# Patient Record
Sex: FEMALE | Race: WHITE | NOT HISPANIC OR LATINO
[De-identification: names, ages, dates, MRNs, and addresses within clinical notes are randomized per-mention and may not be internally consistent; named-entity substitution may affect disease eponyms.]

---

## 2020-09-09 ENCOUNTER — TRANSCRIPTION ENCOUNTER (OUTPATIENT)
Age: 55
End: 2020-09-09

## 2021-01-12 ENCOUNTER — TRANSCRIPTION ENCOUNTER (OUTPATIENT)
Age: 56
End: 2021-01-12

## 2021-03-30 ENCOUNTER — TRANSCRIPTION ENCOUNTER (OUTPATIENT)
Age: 56
End: 2021-03-30

## 2021-10-26 ENCOUNTER — TRANSCRIPTION ENCOUNTER (OUTPATIENT)
Age: 56
End: 2021-10-26

## 2021-12-15 ENCOUNTER — TRANSCRIPTION ENCOUNTER (OUTPATIENT)
Age: 56
End: 2021-12-15

## 2022-06-04 PROBLEM — Z00.00 ENCOUNTER FOR PREVENTIVE HEALTH EXAMINATION: Status: ACTIVE | Noted: 2022-06-04

## 2022-06-20 ENCOUNTER — APPOINTMENT (OUTPATIENT)
Dept: ORTHOPEDIC SURGERY | Facility: CLINIC | Age: 57
End: 2022-06-20
Payer: COMMERCIAL

## 2022-06-20 VITALS — WEIGHT: 175 LBS | BODY MASS INDEX: 25.92 KG/M2 | HEIGHT: 69 IN

## 2022-06-20 DIAGNOSIS — M19.90 UNSPECIFIED OSTEOARTHRITIS, UNSPECIFIED SITE: ICD-10-CM

## 2022-06-20 DIAGNOSIS — Z78.9 OTHER SPECIFIED HEALTH STATUS: ICD-10-CM

## 2022-06-20 DIAGNOSIS — Z83.3 FAMILY HISTORY OF DIABETES MELLITUS: ICD-10-CM

## 2022-06-20 DIAGNOSIS — Z82.49 FAMILY HISTORY OF ISCHEMIC HEART DISEASE AND OTHER DISEASES OF THE CIRCULATORY SYSTEM: ICD-10-CM

## 2022-06-20 PROCEDURE — 99204 OFFICE O/P NEW MOD 45 MIN: CPT

## 2022-06-20 PROCEDURE — 73502 X-RAY EXAM HIP UNI 2-3 VIEWS: CPT | Mod: RT

## 2022-06-20 PROCEDURE — 73564 X-RAY EXAM KNEE 4 OR MORE: CPT | Mod: RT

## 2022-06-20 PROCEDURE — 99203 OFFICE O/P NEW LOW 30 MIN: CPT

## 2022-06-20 NOTE — HISTORY OF PRESENT ILLNESS
[de-identified] : The patient is a 57 year old R hand dominant female who presents today complaining of R hip and knee pain.  Persistent right hip pain, throbbing, worse with driving and physical activity. Denies paresthesias. Very little relief from previous cortisone injections. Pain is anterior and radiates down into her thigh. Admits to buttock pain. \par Walking in woods in January when she stepped awkwardly and felt immediate pain to the inside of her right knee. Pain has been intermittent, comes and goes but has been worse lately, worse navigating stairs. Admits to swelling at time of injury.  \par Date of Injury/Onset: 05/2019 (hip), 01/2022 (knee)\par Pain:    At Rest: Hip 4/10, Knee 2/10 \par With Activity: Hip 7/10, Knee 4/10\par Mechanism of injury: Overuse, running (hip); slipped and caught self while walking in the woods (knee) \par This is not a Work Related Injury being treated under Worker's Compensation.\par This is not an athletic injury occurring associated with an interscholastic or organized sports team.\par Quality of symptoms: focal ache, radiates to mid thigh (Hip); aching, sharp w/stairs (knee)\par Improves with: PT helped significantly for hip, No tx on knee\par Worse with: Driving (hip/knee), walking on unstable surfaces and stairs (knee)\par Prior treatment: PT, injections (hip), \par Prior Imaging: MRI (08/2019) \par Out of work/sport: No, since N/A\par School/Sport/Position/Occupation:  \par Additional Information: None\par

## 2022-06-20 NOTE — DISCUSSION/SUMMARY
[de-identified] : The patient has tried  physical therapy, anti-inflammatories, rest, RICE, all with no relief. Let this note serve as a letter of medical necessity for MRI. They will have a right knee MRI to evaluate for MMT. They will follow up with me after test. She will have a right hip hip MRI to evaluate impingement. \par Follow up after tests.\par \par "Written by Paul Ramsey, acting as Scribe for Swapnil Simmons M.D" \par \par Home Exercise \par \par  The patient is instructed on a home exercise program. \par  \par RICE \par I explained to the patient that rest, ice, compression, and elevation would benefit them.  They may return to activity after follow-up or when they no longer have any pain. \par  \par Pain Guide Activities \par The patient was advised to let pain guide the gradual advancement of activities. \par  \par Activity Modification \par The patient was advised to modify their activities. \par  \par Dx / Natural History \par The patient was advised of the diagnosis.  The natural history of the pathology was explained in full to the patient in layman's terms.  Several different treatment options were discussed and explained in full to the patient including the risks and benefits of both surgical and non-surgical treatments.  All questions and concerns were answered.\par

## 2022-06-20 NOTE — PHYSICAL EXAM
[5___] : hamstring 5[unfilled]/5 [Positive] : positive Bharat [Right] : right hip with pelvis [All Views] : anteroposterior, lateral [There are no fractures, subluxations or dislocations. No significant abnormalities are seen] : There are no fractures, subluxations or dislocations. No significant abnormalities are seen [FreeTextEntry3] : \par pain with flexion, anterolateral pain with flex abduction, no pain with log roll, +fabers [] : non-antalgic [FreeTextEntry9] : 0-130

## 2022-06-27 ENCOUNTER — RESULT REVIEW (OUTPATIENT)
Age: 57
End: 2022-06-27

## 2022-09-19 ENCOUNTER — APPOINTMENT (OUTPATIENT)
Dept: ORTHOPEDIC SURGERY | Facility: CLINIC | Age: 57
End: 2022-09-19
Payer: COMMERCIAL

## 2022-09-19 VITALS — WEIGHT: 175 LBS | HEIGHT: 69 IN | BODY MASS INDEX: 25.92 KG/M2

## 2022-09-19 DIAGNOSIS — M79.18 MYALGIA, OTHER SITE: ICD-10-CM

## 2022-09-19 PROCEDURE — 99213 OFFICE O/P EST LOW 20 MIN: CPT

## 2022-09-19 PROCEDURE — 99214 OFFICE O/P EST MOD 30 MIN: CPT

## 2022-09-19 NOTE — DISCUSSION/SUMMARY
[de-identified] : Patient will begin pt for MMT. She will follow up with our hip service in 6 weeks. \par \par "Written by Paul Ramsey, acting as Scribe for Swapnil Simmons M.D" \par \par Home Exercise \par \par  The patient is instructed on a home exercise program. \par  \par RICE \par I explained to the patient that rest, ice, compression, and elevation would benefit them.  They may return to activity after follow-up or when they no longer have any pain. \par  \par Pain Guide Activities \par The patient was advised to let pain guide the gradual advancement of activities. \par  \par Activity Modification \par The patient was advised to modify their activities. \par  \par Dx / Natural History \par The patient was advised of the diagnosis.  The natural history of the pathology was explained in full to the patient in layman's terms.  Several different treatment options were discussed and explained in full to the patient including the risks and benefits of both surgical and non-surgical treatments.  All questions and concerns were answered.\par

## 2022-09-19 NOTE — PHYSICAL EXAM
[Right] : right knee [5___] : hamstring 5[unfilled]/5 [Positive] : positive Bharat [FreeTextEntry3] : \par pain with flexion, anterolateral pain with flex abduction, no pain with log roll, +fabers [] : non-antalgic [FreeTextEntry9] : 0-130

## 2022-09-19 NOTE — HISTORY OF PRESENT ILLNESS
[de-identified] : 9/19/22: Patient presents for MRI [facility]. She reports that her knee pain has improved some. The hip pain is the same. Patient reports that she had her lumbar evaluated before coming in to see . \par \par The patient is a 57 year old R hand dominant female who presents today complaining of R hip and knee pain. Persistent right hip pain, throbbing, worse with driving and physical activity. \par Date of Injury/Onset: 05/2019 (hip), 01/2022 (knee)\par Pain: At Rest: Hip 4/10, Knee 2/10 \par With Activity: Hip 7/10, Knee 4/10\par Mechanism of injury: Overuse, running (hip); slipped and caught self while walking in the woods (knee) \par This is not a Work Related Injury being treated under Worker's Compensation.\par This is not an athletic injury occurring associated with an interscholastic or organized sports team.\par Quality of symptoms: focal ache, radiates to mid thigh (Hip); aching, sharp w/stairs (knee)\par Improves with: PT helped significantly for hip, No tx on knee\par Worse with: Driving (hip/knee), walking on unstable surfaces and stairs (knee)\par Prior treatment: PT, injections (hip), \par Prior Imaging: MRI ([facility])\par Out of work/sport: No, since N/A\par School/Sport/Position/Occupation:  \par Additional Information: None\par

## 2022-09-19 NOTE — DATA REVIEWED
[FreeTextEntry1] : [facility] June 30, 2022 RT Knee: Trace joint effusion. \par \par   \par \par Minimal lateral patellar subluxation. \par \par   \par \par Medial meniscus tear as noted. \par \par   \par \par Patellar chondromalacia as described.     RT Hip MRI: Trace bilateral hip joint effusions. \par \par   \par \par Mild tendinosis at the insertion of the right gluteus medius tendon and at the \par \par insertion of the contralateral (left) common hamstring tendon. \par \par   \par \par Degenerative tearing of the right acetabular labrum as noted. \par \par   \par \par Minimal right hip osteoarthritis. \par \par   \par \par Mildly diminished femoral head/neck offset on the right, a morphologic feature \par \par which may be seen in cam-type femoroacetabular impingement. See above, correlate \par \par clinically. \par \par   \par \par Lower lumbar spondylosis as noted. \par \par The patient has tried multiple cortisone injections, physical therapy, anti-inflammatories, rest, all with no relief. Let this note serve as a letter of medical necessity for authorization of hyaluronic acid injections.(Euflexxa). Will follow up after approval for injections.

## 2022-10-25 ENCOUNTER — APPOINTMENT (OUTPATIENT)
Dept: ORTHOPEDIC SURGERY | Facility: CLINIC | Age: 57
End: 2022-10-25

## 2022-10-25 VITALS — BODY MASS INDEX: 25.92 KG/M2 | HEIGHT: 69 IN | WEIGHT: 175 LBS

## 2022-10-25 PROCEDURE — 99204 OFFICE O/P NEW MOD 45 MIN: CPT

## 2022-10-25 RX ORDER — DICLOFENAC SODIUM 75 MG/1
75 TABLET, DELAYED RELEASE ORAL TWICE DAILY
Qty: 60 | Refills: 0 | Status: ACTIVE | COMMUNITY
Start: 2022-10-25 | End: 1900-01-01

## 2022-11-09 NOTE — PHYSICAL EXAM
[de-identified] : The patient is a well appearing 57 year.\par \par Patient ambulates with an nonantalgic gait. \par \par Pelvis: \par \par Symphysis pubis: Stable, no tenderness to palpation \par Palpable Hernias/Masses: None \par Resisted Sit Up: Negative \par \par Right Hip/Groin/Thigh: \par ROM: \par     Flexion: 0-120 degrees \par     Extension: 0-10 degrees \par     ABduction: 0-40 degrees \par     Adduction: 0-40 degrees \par     External Rotation: 0-40 degrees \par     Internal Rotation: 0-35 degrees \par PROVOCATIVE TESTING: \par      YANIRA TST: Positive \par      ZION'S TST: Negative \par      PIRIFORMIS TST: Negative \par      Straight Leg Raise:  Negative \par      Seated Straight Leg Raise: Negative \par      IMPINGEMENT TST: Positive \par      Resisted ADduction : Negative \par \par PALPATION: \par         ASIS: Nontender \par         AIIS: Nontender \par         Greater Trochanter/IT-Band: Nontender \par         Illiac Crest: Nontender \par         Ischial Tuberosity: TTP \par         Hip Flexor: Nontender \par         Quadriceps: Nontender \par         Proximal Hamstring Origin: TTP \par         Proximal Hamstring Muscle-Tendon Junction: TTP \par         Hamstring Muscle Belly: Nontender \par         ADductor :  Nontender  \par         Lower Rectus Abdominis:  Nontender \par INSPECTION: \par         Deformity: No  \par         Erythema: No \par         Ecchymosis: No \par         Abrasions: No \par         Effusion: No \par         Groin mass/bulge: No \par        Palpable Hernia: No \par NEUROLOGIC EXAM: \par         Sensation L2-S1: Grossly Intact \par MOTOR EXAM: \par         Quadriceps: 5 out of 5 \par         Hamstrings: 5 out of 5 \par         ABduction: 5 out of 5 \par         ADduction: 5 out of 5 \par         Hip Flexion: 5 out of 5 \par         TA: 5 out of 5 \par         GS: 5 out of 5 \par Circulatory/Pulses: \par         Dorsalis Pedis:      2+ \par         Posterior Tibialis: 2+ \par  \par Left Hip/Groin/Thigh: \par ROM: \par     Flexion: 0-120 degrees \par     Extension: 0-10 degrees \par     ABduction: 0-40 degrees \par     Adduction: 0-40 degrees \par     External Rotation: 0-40 degrees \par     Internal Rotation: 0-35 degrees \par PROVOCATIVE TESTING: \par      YANIRA TST: Negative \par      ZION'S TST: Negative \par      PIRIFORMIS TST: Negative \par      Straight Leg Raise:  Negative \par      Seated Straight Leg Raise: Negative \par      IMPINGEMENT TST: Negative \par      Resisted ADduction : Negative \par PALPATION: \par         ASIS: Nontender \par         AIIS: Nontender \par         Greater Trochanter/IT-Band: Nontender \par         Illiac Crest: Nontender \par         Ischial Tuberosity: Nontender \par         Hip Flexor: Nontender \par         Quadriceps: Nontender \par         Proximal Hamstring Origin: Nontender \par         Proximal Hamstring Muscle-Tendon Junction: Nontender \par         Hamstring Muscle Belly: Nontender \par         ADductor :  Nontender  \par         Lower Rectus Abdominis:  Nontender \par INSPECTION: \par         Deformity: No  \par         Erythema: No \par         Ecchymosis: No \par         Abrasions: No \par         Effusion: No \par         Groin mass/bulge: No \par        Palpable Hernia: No \par NEUROLOGIC EXAM: \par         Sensation L2-S1: Grossly Intact \par MOTOR EXAM: \par         Quadriceps: 5 out of 5 \par         Hamstrings: 5 out of 5 \par         ABduction: 5 out of 5 \par         ADduction: 5 out of 5 \par         Hip Flexion: 5 out of 5 \par         TA: 5 out of 5 \par         GS: 5 out of 5 \par Circulatory/Pulses: \par         Dorsalis Pedis:      2+ \par         Posterior Tibialis: 2+  [Right] : right hip with pelvis [There are no fractures, subluxations or dislocations. No significant abnormalities are seen] : There are no fractures, subluxations or dislocations. No significant abnormalities are seen [Mild arthritis (Tonnis Grade 1)] : Mild arthritis (Tonnis Grade 1)

## 2022-11-09 NOTE — DISCUSSION/SUMMARY
[de-identified] : DARA (femoroacetabular impingement) treatment varies according to the person and the severity of the damage. Treatment options for DARA include:\par \par Corticosteroids: These drugs reduce inflammation (swelling) in and around the hip joint. Doctors usually deliver this treatment by injection.\par Nonsteroidal anti-inflammatory drugs (NSAIDs): This type of medicine reduces inflammation and is typically taken in pill form.\par Physical therapy: Special exercises can help strengthen the joint and improve mobility.\par Rest: By limiting activity, you can reduce friction in the hip joint.\par Surgery: Doctors repair the joint with operations including:\par Arthroscopic hip surgery: In this minimally invasive procedure, a doctor repairs or removes damaged bone or cartilage.\par Traditional hip surgery: In more severe cases, doctors make a larger incision in an open operation to repair damage. \par \par The patient's current medication management of their orthopedic diagnosis was reviewed today:\par (1) We discussed a comprehensive treatment plan that included possible pharmaceutical management involving the use of prescription strength medications including but not limited to options such as oral Naprosyn 500mg BID, once daily Meloxicam 15 mg, or 500-650 mg Tylenol versus over the counter oral medications and topical prescription NSAID Pennsaid vs over the counter Voltaren gel.\par \par (2) There is a moderate risk of morbidity with further treatment, especially from use of prescription strength medications and possible side effects of these medications which include upset stomach with oral medications, skin reactions to topical medications and cardiac/renal issues with long term use.\par \par (3) I recommended that the patient follow-up with their medical physician to discuss any significant specific potential issues with long term medication use such as interactions with current medications or with exacerbation of underlying medical comorbidities.\par \par (4) The benefits and risks associated with use of injectable, oral or topical, prescription and over the counter anti-inflammatory medications were discussed with the patient. The patient voiced understanding of the risks including but not limited to bleeding, stroke, kidney dysfunction, heart disease, and were referred to the black box warning label for further information.\par  \par All of the patient's questions were answered to Her satisfaction. Diagnoses and potential treatments were reviewed. She agreed with the plan and would like to move forward with it.

## 2022-11-09 NOTE — HISTORY OF PRESENT ILLNESS
[de-identified] : The patient is a 57 year old R hand dominant female who presents today complaining of R hip pain\par Date of Injury/Onset: 05/2019\par Pain: At Rest: Hip 4/10, \par With Activity: Hip 7/10, \par Mechanism of injury: Overuse, running (hip)\par This is not a Work Related Injury being treated under Worker's Compensation.\par This is not an athletic injury occurring associated with an interscholastic or organized sports team.\par Quality of symptoms: focal ache, radiates to mid thigh (Hip); \par Improves with: PT helped significantly for hip, No tx on knee\par Worse with: Driving (hip/knee),\par Prior treatment: PT, injections (hip) [id number] \par Prior Imaging: MRI ([facility])\par Out of work/sport: No, since N/A\par School/Sport/Position/Occupation:  \par Additional Information: None

## 2022-11-17 ENCOUNTER — APPOINTMENT (OUTPATIENT)
Dept: PAIN MANAGEMENT | Facility: CLINIC | Age: 57
End: 2022-11-17
Payer: COMMERCIAL

## 2022-11-17 PROCEDURE — 77002 NEEDLE LOCALIZATION BY XRAY: CPT | Mod: RT,59

## 2022-11-17 PROCEDURE — 27093 INJECTION FOR HIP X-RAY: CPT

## 2022-11-17 PROCEDURE — 73525 CONTRAST X-RAY OF HIP: CPT | Mod: RT

## 2022-11-17 NOTE — PROCEDURE
[FreeTextEntry3] : Date of Service: 11/17/2022 \par \par Account: [id number] \par \par Patient: SHASHANK RIVAS \par \par YOB: 1965 \par \par Age: 57 year \par \par Surgeon: Cj Wright MD\par \par Pre-Operative Diagnosis: \par 1) Right hip impingement syndrome\par 2) Chronic right hip pain\par \par Post-Operative Diagnosis: Same\par \par Procedure: Right Hip arthrogram and steroid injection under fluoroscopic guidance\par \par This procedure was carried out using fluoroscopic guidance. The risks and benefits of the procedure were discussed extensively with the patient. The consent of the patient was obtained and the following procedure was performed. A timeout was performed with all essential staff present and the site and side were verified.\par \par The patient was placed in the supine position with right hip flexed and externally rotated 25 degrees. The area of the right groin was prepped and draped in a sterile fashion. The fluoroscopic image intensifier was then positioned so that the right hip appeared in view, and the midline intertrochanteric region was identified and marked. The skin and subcutaneous structures were then anesthetized using 1 cc of 1% lidocaine. A 22 gauge spinal needle was then inserted and directed into the right hip intra-capsular region. After negative aspiration for heme and CSF, 3 cc of Omnipaque was injected and appeared to fill the joint margins.\par \par Right hip arthrogram showed no intravascular flow, and good spread around the femoral head and to the acetabulum. An injectate of 2 cc 0.25% marcaine, 2cc 1% lidocaine plus 1cc 6mg/mL Celestone was then injected into the right hip space.\par \par The needle was subsequently removed. Vital signs remained normal. Pulse oximeter was used throughout the procedure and the patient's pulse and oxygen saturation remained within normal limits. The patient tolerated the procedure well. There were no complications. The patient was instructed to apply ice over the injection sites for twenty minutes every two hours for the next 24 to 48 hours.\par \par Disposition:\par  1. The patient was advised to F/U in 1-2 weeks to assess the response to the injection.\par  2. The patient was also instructed to contact me immediately if there were any concerns related to the procedure performed.

## 2022-12-12 ENCOUNTER — APPOINTMENT (OUTPATIENT)
Dept: ORTHOPEDIC SURGERY | Facility: CLINIC | Age: 57
End: 2022-12-12

## 2022-12-12 VITALS — WEIGHT: 175 LBS | BODY MASS INDEX: 25.92 KG/M2 | HEIGHT: 69 IN

## 2022-12-12 PROCEDURE — 99214 OFFICE O/P EST MOD 30 MIN: CPT

## 2022-12-12 NOTE — PHYSICAL EXAM
[de-identified] : The patient is a well appearing 57 year.\par \par Patient ambulates with an nonantalgic gait. \par \par Pelvis: \par \par Symphysis pubis: Stable, no tenderness to palpation \par Palpable Hernias/Masses: None \par Resisted Sit Up: Negative \par \par Right Hip/Groin/Thigh: \par ROM: \par     Flexion: 0-120 degrees \par     Extension: 0-10 degrees \par     ABduction: 0-40 degrees \par     Adduction: 0-40 degrees \par     External Rotation: 0-40 degrees \par     Internal Rotation: 0-35 degrees \par PROVOCATIVE TESTING: \par      YANIRA TST: Positive \par      ZION'S TST: Negative \par      PIRIFORMIS TST: Negative \par      Straight Leg Raise:  Negative \par      Seated Straight Leg Raise: Negative \par      IMPINGEMENT TST: Positive \par      Resisted ADduction : Negative \par \par PALPATION: \par         ASIS: Nontender \par         AIIS: Nontender \par         Greater Trochanter/IT-Band: Nontender \par         Illiac Crest: Nontender \par         Ischial Tuberosity: TTP \par         Hip Flexor: Nontender \par         Quadriceps: Nontender \par         Proximal Hamstring Origin: TTP \par         Proximal Hamstring Muscle-Tendon Junction: TTP \par         Hamstring Muscle Belly: Nontender \par         ADductor :  Nontender  \par         Lower Rectus Abdominis:  Nontender \par INSPECTION: \par         Deformity: No  \par         Erythema: No \par         Ecchymosis: No \par         Abrasions: No \par         Effusion: No \par         Groin mass/bulge: No \par        Palpable Hernia: No \par NEUROLOGIC EXAM: \par         Sensation L2-S1: Grossly Intact \par MOTOR EXAM: \par         Quadriceps: 5 out of 5 \par         Hamstrings: 5 out of 5 \par         ABduction: 5 out of 5 \par         ADduction: 5 out of 5 \par         Hip Flexion: 5 out of 5 \par         TA: 5 out of 5 \par         GS: 5 out of 5 \par Circulatory/Pulses: \par         Dorsalis Pedis:      2+ \par         Posterior Tibialis: 2+ \par  \par Left Hip/Groin/Thigh: \par ROM: \par     Flexion: 0-120 degrees \par     Extension: 0-10 degrees \par     ABduction: 0-40 degrees \par     Adduction: 0-40 degrees \par     External Rotation: 0-40 degrees \par     Internal Rotation: 0-35 degrees \par PROVOCATIVE TESTING: \par      YANIRA TST: Negative \par      ZION'S TST: Negative \par      PIRIFORMIS TST: Negative \par      Straight Leg Raise:  Negative \par      Seated Straight Leg Raise: Negative \par      IMPINGEMENT TST: Negative \par      Resisted ADduction : Negative \par PALPATION: \par         ASIS: Nontender \par         AIIS: Nontender \par         Greater Trochanter/IT-Band: Nontender \par         Illiac Crest: Nontender \par         Ischial Tuberosity: Nontender \par         Hip Flexor: Nontender \par         Quadriceps: Nontender \par         Proximal Hamstring Origin: Nontender \par         Proximal Hamstring Muscle-Tendon Junction: Nontender \par         Hamstring Muscle Belly: Nontender \par         ADductor :  Nontender  \par         Lower Rectus Abdominis:  Nontender \par INSPECTION: \par         Deformity: No  \par         Erythema: No \par         Ecchymosis: No \par         Abrasions: No \par         Effusion: No \par         Groin mass/bulge: No \par        Palpable Hernia: No \par NEUROLOGIC EXAM: \par         Sensation L2-S1: Grossly Intact \par MOTOR EXAM: \par         Quadriceps: 5 out of 5 \par         Hamstrings: 5 out of 5 \par         ABduction: 5 out of 5 \par         ADduction: 5 out of 5 \par         Hip Flexion: 5 out of 5 \par         TA: 5 out of 5 \par         GS: 5 out of 5 \par Circulatory/Pulses: \par         Dorsalis Pedis:      2+ \par         Posterior Tibialis: 2+

## 2022-12-12 NOTE — DISCUSSION/SUMMARY
[de-identified] : DARA (femoroacetabular impingement) treatment varies according to the person and the severity of the damage. Treatment options for DARA include:\par \par Corticosteroids: These drugs reduce inflammation (swelling) in and around the hip joint. Doctors usually deliver this treatment by injection.\par Nonsteroidal anti-inflammatory drugs (NSAIDs): This type of medicine reduces inflammation and is typically taken in pill form.\par Physical therapy: Special exercises can help strengthen the joint and improve mobility.\par Rest: By limiting activity, you can reduce friction in the hip joint.\par Surgery: Doctors repair the joint with operations including:\par Arthroscopic hip surgery: In this minimally invasive procedure, a doctor repairs or removes damaged bone or cartilage.\par Traditional hip surgery: In more severe cases, doctors make a larger incision in an open operation to repair damage. \par \par Instructions: Dx / Natural History\par The patient was advised of the diagnosis.  The natural history of the pathology was explained in full to the patient in layman's terms.  Several different treatment options were discussed and explained in full to the patient including the risks and benefits of both surgical and non-surgical treatments.  All questions and concerns were answered. \par \par RICE\par I explained to the patient that rest, ice, compression, and elevation would benefit them.  They may return to activity after follow-up or when they no longer have any pain.\par \par NSAIDs - OTC\par Patient is to begin over the counter oral anti-inflammatory medications on an as needed basis, as long as there are no medical contraindications.  Patient is counseled on possible GI and blood pressure side effects.\par \par Pain Guide Activities\par The patient was advised to let pain guide the gradual advancement of activities.\par \par Icing\par The patient was advised to apply ice (wrapped in a towel or protective covering) to the area daily (20 minutes at a time, 2-4X/day).\par \par All of the patient's questions were answered to Her satisfaction. Diagnoses and potential treatments were reviewed. She agreed with the plan and would like to move forward with it.

## 2022-12-12 NOTE — HISTORY OF PRESENT ILLNESS
[de-identified] : The patient is a 57 year old RHD F who presents today complaining of R hip pain s/p injection.\par \par 12/12/2022 \par \par SHASHANK is presenting today for followup. Pain and symptoms are the same again, the injection only lasted for about 2 weeks. She has been exercising. She denies any numbness/tingling/fevers/chills. Pain only anterior and in groin.\par \par Date of Injury/Onset: 05/2019\par Pain: At Rest: Hip 5/10, \par With Activity: Hip 7/10, \par Mechanism of injury: Overuse, running (hip)\par Quality of symptoms: focal ache, radiates to mid thigh (Hip); \par Improves with: PT helped significantly for hip, No tx on knee\par Worse with: Driving (hip/knee),\par Treatment/ Imaging/ Studies since last visit: Hip INJ 11/17/22\par Out of work/sport: No, since N/A\par School/Sport/Position/Occupation:  \par Additional Information: None \par

## 2023-01-12 ENCOUNTER — APPOINTMENT (OUTPATIENT)
Dept: ORTHOPEDIC SURGERY | Facility: CLINIC | Age: 58
End: 2023-01-12
Payer: COMMERCIAL

## 2023-01-12 VITALS — WEIGHT: 175 LBS | BODY MASS INDEX: 25.92 KG/M2 | HEIGHT: 69 IN

## 2023-01-12 DIAGNOSIS — M25.859 OTHER SPECIFIED JOINT DISORDERS, UNSPECIFIED HIP: ICD-10-CM

## 2023-01-12 DIAGNOSIS — Q65.89 OTHER SPECIFIED CONGENITAL DEFORMITIES OF HIP: ICD-10-CM

## 2023-01-12 DIAGNOSIS — S73.199A OTHER SPRAIN OF UNSPECIFIED HIP, INITIAL ENCOUNTER: ICD-10-CM

## 2023-01-12 PROCEDURE — 99214 OFFICE O/P EST MOD 30 MIN: CPT

## 2023-01-12 NOTE — ASSESSMENT
[FreeTextEntry1] : 57F p/w R labrum tear, OA\par \par She would like to proceed with R CELESTINE, r b a explained to patient, we will call to schedule\par \par We discussed my findings and the natural history of their condition. We talked about the details of the proposed surgery and the recovery. We discussed the material risks, possible benefits and alternatives to surgery. The risks include but are not limited to infection, bleeding and possible need for blood transfusion, fracture, bowel blockage, bladder retention or infection, need for reoperation, stiffness and/or limited range of motion, possible damage to nerves and blood vessels, failure of fixation of components, risk of deep vein thromboses and pulmonary embolism, wound healing problems, dislocation, and possible leg length discrepancy. Although incredibly rare, we also discussed the risks of a cardiac event, stroke and even death during, or following, the surgery. We discussed the type of implants the patient will be receiving and the type of fixation that will be used, as well as whether a robot or computer navigation aide will be used. The patient understands they will need medical clearance and will attend a preoperative joint education class. We also discussed the type of anesthesia they will receive, and the risks associated with hospital or rehab length of stay, obesity, diabetes and smoking.\par

## 2023-01-12 NOTE — HISTORY OF PRESENT ILLNESS
[Gradual] : gradual [4] : 4 [3] : 3 [Radiating] : radiating [Constant] : constant [Ice] : ice [de-identified] : 1/12/23: 56yo F with longstanding right hip pain since 2017 with no injury. She had initially been treated by outside ortho with PT/HEP and IA hip CSI that provided mild relief. Most recently she has been treated by Dr. Simmons and Dr. Wright with with PT and repeat hip CSI that is not providing sufficient relief. Most bothersome after prolonged sitting. Notes occasional hip locking and pinching.  [] : no [FreeTextEntry5] : rt hip pain started in 2017 with no cause of injury [FreeTextEntry7] : rt buttock/leg [de-identified] : driving [de-identified] : dr sabillon & dr wilhelm-orthopedic, orthopedic [de-identified] : xr rt hip done at [facility], mri rt hip done at

## 2023-02-22 ENCOUNTER — NON-APPOINTMENT (OUTPATIENT)
Age: 58
End: 2023-02-22

## 2023-07-01 ENCOUNTER — NON-APPOINTMENT (OUTPATIENT)
Age: 58
End: 2023-07-01

## 2023-07-03 ENCOUNTER — OUTPATIENT (OUTPATIENT)
Dept: OUTPATIENT SERVICES | Facility: HOSPITAL | Age: 58
LOS: 1 days | End: 2023-07-03
Payer: COMMERCIAL

## 2023-07-03 ENCOUNTER — APPOINTMENT (OUTPATIENT)
Dept: RADIOLOGY | Facility: CLINIC | Age: 58
End: 2023-07-03
Payer: COMMERCIAL

## 2023-07-03 DIAGNOSIS — Z00.8 ENCOUNTER FOR OTHER GENERAL EXAMINATION: ICD-10-CM

## 2023-07-03 DIAGNOSIS — M25.572 PAIN IN LEFT ANKLE AND JOINTS OF LEFT FOOT: ICD-10-CM

## 2023-07-03 PROCEDURE — 73630 X-RAY EXAM OF FOOT: CPT

## 2023-07-03 PROCEDURE — 73630 X-RAY EXAM OF FOOT: CPT | Mod: 26,LT

## 2023-07-06 NOTE — DATA REVIEWED
Left vm for patient regarding labs.   7/6/23 [MRI] : MRI [Right] : of the right [Hip] : hip [Report was reviewed and noted in the chart] : The report was reviewed and noted in the chart [I independently reviewed and interpreted images and report] : I independently reviewed and interpreted images and report [FreeTextEntry1] : Trace bilateral hip joint effusions.\par \par Mild tendinosis at the insertion of the right gluteus medius tendon and at the\par insertion of the contralateral (left) common hamstring tendon.\par \par Degenerative tearing of the right acetabular labrum as noted.\par \par Minimal right hip osteoarthritis.\par \par Mildly diminished femoral head/neck offset on the right, a morphologic feature\par which may be seen in cam-type femoroacetabular impingement. See above, correlate\par clinically.

## 2023-09-30 ENCOUNTER — NON-APPOINTMENT (OUTPATIENT)
Age: 58
End: 2023-09-30

## 2024-01-29 ENCOUNTER — NON-APPOINTMENT (OUTPATIENT)
Age: 59
End: 2024-01-29

## 2024-02-01 ENCOUNTER — NON-APPOINTMENT (OUTPATIENT)
Age: 59
End: 2024-02-01

## 2024-08-21 ENCOUNTER — NON-APPOINTMENT (OUTPATIENT)
Age: 59
End: 2024-08-21